# Patient Record
Sex: MALE | URBAN - METROPOLITAN AREA
[De-identification: names, ages, dates, MRNs, and addresses within clinical notes are randomized per-mention and may not be internally consistent; named-entity substitution may affect disease eponyms.]

---

## 2017-06-22 ENCOUNTER — APPOINTMENT (RX ONLY)
Dept: URBAN - METROPOLITAN AREA OTHER 12 | Facility: OTHER | Age: 57
Setting detail: DERMATOLOGY
End: 2017-06-22

## 2017-06-22 DIAGNOSIS — L57.0 ACTINIC KERATOSIS: ICD-10-CM

## 2017-06-22 PROBLEM — Z85.828 PERSONAL HISTORY OF OTHER MALIGNANT NEOPLASM OF SKIN: Status: ACTIVE | Noted: 2017-06-22

## 2017-06-22 PROCEDURE — 17003 DESTRUCT PREMALG LES 2-14: CPT

## 2017-06-22 PROCEDURE — ? COUNSELING

## 2017-06-22 PROCEDURE — 17000 DESTRUCT PREMALG LESION: CPT

## 2017-06-22 PROCEDURE — ? LIQUID NITROGEN

## 2017-06-22 ASSESSMENT — LOCATION SIMPLE DESCRIPTION DERM
LOCATION SIMPLE: RIGHT SCALP
LOCATION SIMPLE: RIGHT HAND

## 2017-06-22 ASSESSMENT — LOCATION ZONE DERM
LOCATION ZONE: SCALP
LOCATION ZONE: HAND

## 2017-06-22 ASSESSMENT — LOCATION DETAILED DESCRIPTION DERM
LOCATION DETAILED: RIGHT MEDIAL FRONTAL SCALP
LOCATION DETAILED: RIGHT ULNAR DORSAL HAND

## 2017-06-22 NOTE — HPI: EVALUATION OF SKIN LESION(S)
How Severe Are Your Spot(S)?: mild
Have Your Spot(S) Been Treated In The Past?: has not been treated
Hpi Title: Evaluation of a Skin Lesion
Additional History: Patient is traveling in from [place], [place].

## 2017-06-22 NOTE — HPI: SECONDARY COMPLAINT
How Severe Is This Condition?: mild
Additional History: Patient stated he noticed a little scale of the skin flaked off

## 2017-06-22 NOTE — PROCEDURE: LIQUID NITROGEN
Render Post-Care Instructions In Note?: no
Duration Of Freeze Thaw-Cycle (Seconds): 0
Post-Care Instructions: I reviewed with the patient in detail post-care instructions. Patient is to wear sunprotection, and avoid picking at any of the treated lesions. Pt may apply Vaseline to crusted or scabbing areas.
Consent: The patient's consent was obtained including but not limited to risks of crusting, scabbing, blistering, scarring, darker or lighter pigmentary change, recurrence, incomplete removal and infection.
Number Of Freeze-Thaw Cycles: 1 freeze-thaw cycle
Detail Level: Detailed

## 2023-08-21 ENCOUNTER — LAB (OUTPATIENT)
Dept: LAB | Facility: LAB | Age: 63
End: 2023-08-21
Payer: COMMERCIAL

## 2023-08-21 DIAGNOSIS — Z00.00 ROUTINE GENERAL MEDICAL EXAMINATION AT A HEALTH CARE FACILITY: ICD-10-CM

## 2023-08-21 DIAGNOSIS — R21 RASH: Primary | ICD-10-CM

## 2023-08-21 DIAGNOSIS — Z00.00 ROUTINE GENERAL MEDICAL EXAMINATION AT A HEALTH CARE FACILITY: Primary | ICD-10-CM

## 2023-08-21 LAB
ALANINE AMINOTRANSFERASE (SGPT) (U/L) IN SER/PLAS: 43 U/L (ref 10–52)
ALBUMIN (G/DL) IN SER/PLAS: 4.6 G/DL (ref 3.4–5)
ALKALINE PHOSPHATASE (U/L) IN SER/PLAS: 54 U/L (ref 33–136)
ANION GAP IN SER/PLAS: 12 MMOL/L (ref 10–20)
APPEARANCE, URINE: CLEAR
ASPARTATE AMINOTRANSFERASE (SGOT) (U/L) IN SER/PLAS: 36 U/L (ref 9–39)
BASOPHILS (10*3/UL) IN BLOOD BY AUTOMATED COUNT: 0.05 X10E9/L (ref 0–0.1)
BASOPHILS/100 LEUKOCYTES IN BLOOD BY AUTOMATED COUNT: 0.7 % (ref 0–2)
BILIRUBIN TOTAL (MG/DL) IN SER/PLAS: 1 MG/DL (ref 0–1.2)
BILIRUBIN, URINE: NEGATIVE
BLOOD, URINE: NEGATIVE
CALCIDIOL (25 OH VITAMIN D3) (NG/ML) IN SER/PLAS: 35 NG/ML
CALCIUM (MG/DL) IN SER/PLAS: 9.2 MG/DL (ref 8.6–10.6)
CARBON DIOXIDE, TOTAL (MMOL/L) IN SER/PLAS: 28 MMOL/L (ref 21–32)
CHLORIDE (MMOL/L) IN SER/PLAS: 101 MMOL/L (ref 98–107)
CHOLESTEROL (MG/DL) IN SER/PLAS: 187 MG/DL (ref 0–199)
CHOLESTEROL IN HDL (MG/DL) IN SER/PLAS: 49.6 MG/DL
CHOLESTEROL/HDL RATIO: 3.8
COBALAMIN (VITAMIN B12) (PG/ML) IN SER/PLAS: 429 PG/ML (ref 211–911)
COLOR, URINE: YELLOW
CREATININE (MG/DL) IN SER/PLAS: 0.85 MG/DL (ref 0.5–1.3)
EOSINOPHILS (10*3/UL) IN BLOOD BY AUTOMATED COUNT: 0.42 X10E9/L (ref 0–0.7)
EOSINOPHILS/100 LEUKOCYTES IN BLOOD BY AUTOMATED COUNT: 6.2 % (ref 0–6)
ERYTHROCYTE DISTRIBUTION WIDTH (RATIO) BY AUTOMATED COUNT: 13.6 % (ref 11.5–14.5)
ERYTHROCYTE MEAN CORPUSCULAR HEMOGLOBIN CONCENTRATION (G/DL) BY AUTOMATED: 33.6 G/DL (ref 32–36)
ERYTHROCYTE MEAN CORPUSCULAR VOLUME (FL) BY AUTOMATED COUNT: 95 FL (ref 80–100)
ERYTHROCYTES (10*6/UL) IN BLOOD BY AUTOMATED COUNT: 4.75 X10E12/L (ref 4.5–5.9)
ESTIMATED AVERAGE GLUCOSE FOR HBA1C: 91 MG/DL
GFR MALE: >90 ML/MIN/1.73M2
GLUCOSE (MG/DL) IN SER/PLAS: 91 MG/DL (ref 74–99)
GLUCOSE, URINE: NEGATIVE MG/DL
HEMATOCRIT (%) IN BLOOD BY AUTOMATED COUNT: 45 % (ref 41–52)
HEMOGLOBIN (G/DL) IN BLOOD: 15.1 G/DL (ref 13.5–17.5)
HEMOGLOBIN A1C/HEMOGLOBIN TOTAL IN BLOOD: 4.8 %
HIV 1/ 2 AG/AB SCREEN: NONREACTIVE
IMMATURE GRANULOCYTES/100 LEUKOCYTES IN BLOOD BY AUTOMATED COUNT: 0.3 % (ref 0–0.9)
IRON (UG/DL) IN SER/PLAS: 200 UG/DL (ref 35–150)
IRON BINDING CAPACITY (UG/DL) IN SER/PLAS: 385 UG/DL (ref 240–445)
IRON SATURATION (%) IN SER/PLAS: 52 % (ref 25–45)
KETONES, URINE: NEGATIVE MG/DL
LDL: ABNORMAL MG/DL (ref 0–99)
LEUKOCYTE ESTERASE, URINE: NEGATIVE
LEUKOCYTES (10*3/UL) IN BLOOD BY AUTOMATED COUNT: 6.8 X10E9/L (ref 4.4–11.3)
LYMPHOCYTES (10*3/UL) IN BLOOD BY AUTOMATED COUNT: 1.3 X10E9/L (ref 1.2–4.8)
LYMPHOCYTES/100 LEUKOCYTES IN BLOOD BY AUTOMATED COUNT: 19.1 % (ref 13–44)
MONOCYTES (10*3/UL) IN BLOOD BY AUTOMATED COUNT: 0.52 X10E9/L (ref 0.1–1)
MONOCYTES/100 LEUKOCYTES IN BLOOD BY AUTOMATED COUNT: 7.7 % (ref 2–10)
NEUTROPHILS (10*3/UL) IN BLOOD BY AUTOMATED COUNT: 4.48 X10E9/L (ref 1.2–7.7)
NEUTROPHILS/100 LEUKOCYTES IN BLOOD BY AUTOMATED COUNT: 66 % (ref 40–80)
NITRITE, URINE: NEGATIVE
NRBC (PER 100 WBCS) BY AUTOMATED COUNT: 0 /100 WBC (ref 0–0)
PH, URINE: 6 (ref 5–8)
PLATELETS (10*3/UL) IN BLOOD AUTOMATED COUNT: 186 X10E9/L (ref 150–450)
POTASSIUM (MMOL/L) IN SER/PLAS: 4.2 MMOL/L (ref 3.5–5.3)
PROTEIN TOTAL: 7.4 G/DL (ref 6.4–8.2)
PROTEIN, URINE: NEGATIVE MG/DL
SODIUM (MMOL/L) IN SER/PLAS: 137 MMOL/L (ref 136–145)
SPECIFIC GRAVITY, URINE: 1.01 (ref 1–1.03)
THYROTROPIN (MIU/L) IN SER/PLAS BY DETECTION LIMIT <= 0.05 MIU/L: 1.82 MIU/L (ref 0.44–3.98)
TRIGLYCERIDE (MG/DL) IN SER/PLAS: 500 MG/DL (ref 0–149)
UREA NITROGEN (MG/DL) IN SER/PLAS: 11 MG/DL (ref 6–23)
UROBILINOGEN, URINE: <2 MG/DL (ref 0–1.9)
VLDL: ABNORMAL MG/DL (ref 0–40)

## 2023-08-21 PROCEDURE — 85025 COMPLETE CBC W/AUTO DIFF WBC: CPT

## 2023-08-21 PROCEDURE — 80061 LIPID PANEL: CPT

## 2023-08-21 PROCEDURE — 84443 ASSAY THYROID STIM HORMONE: CPT

## 2023-08-21 PROCEDURE — 84153 ASSAY OF PSA TOTAL: CPT

## 2023-08-21 PROCEDURE — 36415 COLL VENOUS BLD VENIPUNCTURE: CPT

## 2023-08-21 PROCEDURE — 87591 N.GONORRHOEAE DNA AMP PROB: CPT

## 2023-08-21 PROCEDURE — 80053 COMPREHEN METABOLIC PANEL: CPT

## 2023-08-21 PROCEDURE — 83036 HEMOGLOBIN GLYCOSYLATED A1C: CPT

## 2023-08-21 PROCEDURE — 87389 HIV-1 AG W/HIV-1&-2 AB AG IA: CPT

## 2023-08-21 PROCEDURE — 83550 IRON BINDING TEST: CPT

## 2023-08-21 PROCEDURE — 87491 CHLMYD TRACH DNA AMP PROBE: CPT

## 2023-08-21 PROCEDURE — 81003 URINALYSIS AUTO W/O SCOPE: CPT

## 2023-08-21 PROCEDURE — 84154 ASSAY OF PSA FREE: CPT

## 2023-08-21 PROCEDURE — 82306 VITAMIN D 25 HYDROXY: CPT

## 2023-08-21 PROCEDURE — 82607 VITAMIN B-12: CPT

## 2023-08-21 PROCEDURE — 83540 ASSAY OF IRON: CPT

## 2023-08-22 LAB
CHLAMYDIA TRACH., AMPLIFIED: NEGATIVE
N. GONORRHEA, AMPLIFIED: NEGATIVE

## 2023-08-24 LAB
PROSTATE SPECIFIC AG (NG/ML) IN SER/PLAS: 0.9 NG/ML (ref 0–4)
PROSTATE SPECIFIC AG FREE (NG/ML) IN SER/PLAS: 0.2 NG/ML
PROSTATE SPECIFIC AG FREE/PROSTATE SPECIFIC AG TOTAL IN SER/PLAS: 22 %

## 2023-08-30 ASSESSMENT — PROMIS GLOBAL HEALTH SCALE
RATE_SOCIAL_SATISFACTION: VERY GOOD
CARRYOUT_SOCIAL_ACTIVITIES: VERY GOOD
RATE_GENERAL_HEALTH: VERY GOOD
RATE_PHYSICAL_HEALTH: VERY GOOD
RATE_QUALITY_OF_LIFE: EXCELLENT
CARRYOUT_PHYSICAL_ACTIVITIES: COMPLETELY
EMOTIONAL_PROBLEMS: SOMETIMES
RATE_AVERAGE_PAIN: 0
RATE_MENTAL_HEALTH: VERY GOOD

## 2023-09-06 ENCOUNTER — LAB (OUTPATIENT)
Dept: LAB | Facility: LAB | Age: 63
End: 2023-09-06
Payer: COMMERCIAL

## 2023-09-06 ENCOUNTER — OFFICE VISIT (OUTPATIENT)
Dept: PRIMARY CARE | Facility: CLINIC | Age: 63
End: 2023-09-06
Payer: COMMERCIAL

## 2023-09-06 VITALS
HEART RATE: 76 BPM | BODY MASS INDEX: 22.09 KG/M2 | WEIGHT: 157.8 LBS | HEIGHT: 71 IN | SYSTOLIC BLOOD PRESSURE: 102 MMHG | DIASTOLIC BLOOD PRESSURE: 62 MMHG

## 2023-09-06 DIAGNOSIS — F51.01 PRIMARY INSOMNIA: ICD-10-CM

## 2023-09-06 DIAGNOSIS — F51.01 PRIMARY INSOMNIA: Primary | ICD-10-CM

## 2023-09-06 DIAGNOSIS — Z00.00 ROUTINE GENERAL MEDICAL EXAMINATION AT A HEALTH CARE FACILITY: ICD-10-CM

## 2023-09-06 PROCEDURE — 99396 PREV VISIT EST AGE 40-64: CPT | Performed by: INTERNAL MEDICINE

## 2023-09-06 PROCEDURE — 80368 SEDATIVE HYPNOTICS: CPT

## 2023-09-06 PROCEDURE — 80307 DRUG TEST PRSMV CHEM ANLYZR: CPT

## 2023-09-06 PROCEDURE — 80358 DRUG SCREENING METHADONE: CPT

## 2023-09-06 PROCEDURE — 80365 DRUG SCREENING OXYCODONE: CPT

## 2023-09-06 PROCEDURE — 93000 ELECTROCARDIOGRAM COMPLETE: CPT | Performed by: INTERNAL MEDICINE

## 2023-09-06 PROCEDURE — 80361 OPIATES 1 OR MORE: CPT

## 2023-09-06 PROCEDURE — 80346 BENZODIAZEPINES1-12: CPT

## 2023-09-06 PROCEDURE — 80354 DRUG SCREENING FENTANYL: CPT

## 2023-09-06 PROCEDURE — 80373 DRUG SCREENING TRAMADOL: CPT

## 2023-09-06 RX ORDER — ZOLPIDEM TARTRATE 10 MG/1
TABLET ORAL
COMMUNITY
Start: 2016-09-09 | End: 2023-09-07 | Stop reason: SDUPTHER

## 2023-09-06 RX ORDER — DICLOFENAC POTASSIUM 50 MG/1
1 TABLET, FILM COATED ORAL 2 TIMES DAILY PRN
COMMUNITY
Start: 2021-08-13

## 2023-09-06 RX ORDER — ALLOPURINOL 300 MG/1
600 TABLET ORAL DAILY
COMMUNITY
End: 2024-02-16 | Stop reason: SDUPTHER

## 2023-09-06 RX ORDER — SILDENAFIL 100 MG/1
TABLET, FILM COATED ORAL
COMMUNITY
Start: 2021-08-13

## 2023-09-06 NOTE — PATIENT INSTRUCTIONS
Ghassan,     We are doing a urine drug test today for the zolpidem prescription.   Call [phone] to schedule calcium score with any  radiology department.   Get the flu shot anytime, the COVID shot when the updated version comes out in October.     CL

## 2023-09-06 NOTE — PROGRESS NOTES
"Ghassan Rabago is a 62 yo M presenting for CPE.     Abd pain last year. Tried dicyclomine c/b sig intolerances   Gout - allopurinol 600   Diverticulitis 4.2019 s/p sigmoid colectomy   Vit D def, resolved 8.23   Meniscal tear 4.2021 knee MRI   Hypertriglyceridemia, stbale and monitoring   Insomnia - zolpidem PRN   Occ hand arthritis - diclofenac PRN   ED - sildenafil PRN   Pityriasis rosea x2 - self resolved   Primary insomnia - zolpidem 10 mg daily No parasomnias.     #HM   -screen labs 8.23   -cscope 1.2020 - 10 years   -tdpa 2019       History of Present Illness  I have personally reviewed the [place]AR[place] report for Ghassan Arreola have considered the risks of abuse, dependence, addiction and diversion.   Is the patient prescribed a combination of a benzodiazepine and opioid? No.   Last urine drug screening date/ordered today: 9.6.23  Results of last screen: Results as expected.   Controlled Substance Agreement:   I have printed this form and reviewed each line item with the patient and the patient has verbalized understanding.   Date of the last Controlled Substance Agreement: 9.6.23  SLEEP AIDS   What is the patient’s goal of therapy? insomnia.  Is this being achieved with current treatment? yes.  Activities of Daily Living:   Yes, it is my opinion that this patient is benefitting from sleep aid therapy .   Physical functioning: Better   Family relationships: Better   Social relationships: Better   Mood: Better   Sleep patterns: Better   Overall functioning: Better     71\"   168 last year --> 157.8 lb         Gen Aox3 NAD well appearing   HEENT mmm pharynx clear no cervical LAD   Eyes sclerae clear   CV rrr nl s1/2 no m/r/g  Pulm CTAB no adventitious sounds   Ext warm dry no edema 2+ DP pulse           "

## 2023-09-07 ENCOUNTER — TELEPHONE (OUTPATIENT)
Dept: PRIMARY CARE | Facility: CLINIC | Age: 63
End: 2023-09-07
Payer: COMMERCIAL

## 2023-09-07 DIAGNOSIS — F51.01 PRIMARY INSOMNIA: Primary | ICD-10-CM

## 2023-09-07 RX ORDER — ZOLPIDEM TARTRATE 10 MG/1
TABLET ORAL
Qty: 90 TABLET | Refills: 0 | Status: SHIPPED | OUTPATIENT
Start: 2023-09-07

## 2023-09-11 LAB
6-ACETYLMORPHINE: <25 NG/ML
7-AMINOCLONAZEPAM: <25 NG/ML
ALPHA-HYDROXYALPRAZOLAM: <25 NG/ML
ALPHA-HYDROXYMIDAZOLAM: <25 NG/ML
ALPRAZOLAM: <25 NG/ML
AMPHETAMINE (PRESENCE) IN URINE BY SCREEN METHOD: ABNORMAL
BARBITURATES PRESENCE IN URINE BY SCREEN METHOD: ABNORMAL
CANNABINOIDS IN URINE BY SCREEN METHOD: ABNORMAL
CHLORDIAZEPOXIDE: <25 NG/ML
CLONAZEPAM: <25 NG/ML
COCAINE (PRESENCE) IN URINE BY SCREEN METHOD: ABNORMAL
CODEINE: <50 NG/ML
CREATINE, URINE FOR DRUG: 267.2 MG/DL
DIAZEPAM: <25 NG/ML
DRUG SCREEN COMMENT URINE: ABNORMAL
EDDP: <25 NG/ML
FENTANYL CONFIRMATION, URINE: <2.5 NG/ML
HYDROCODONE: <25 NG/ML
HYDROMORPHONE: <25 NG/ML
LORAZEPAM: <25 NG/ML
METHADONE CONFIRMATION,URINE: <25 NG/ML
MIDAZOLAM: <25 NG/ML
MORPHINE URINE: <50 NG/ML
NORDIAZEPAM: <25 NG/ML
NORFENTANYL: <2.5 NG/ML
NORHYDROCODONE: <25 NG/ML
NOROXYCODONE: <25 NG/ML
O-DESMETHYLTRAMADOL: <50 NG/ML
OXAZEPAM: <25 NG/ML
OXYCODONE: <25 NG/ML
OXYMORPHONE: <25 NG/ML
PHENCYCLIDINE (PRESENCE) IN URINE BY SCREEN METHOD: ABNORMAL
TEMAZEPAM: <25 NG/ML
TRAMADOL: <50 NG/ML
ZOLPIDEM METABOLITE (ZCA): >1000 NG/ML
ZOLPIDEM: 26 NG/ML

## 2024-02-16 DIAGNOSIS — M1A.9XX0 CHRONIC GOUT WITHOUT TOPHUS, UNSPECIFIED CAUSE, UNSPECIFIED SITE: ICD-10-CM

## 2024-02-17 RX ORDER — ALLOPURINOL 300 MG/1
600 TABLET ORAL DAILY
Qty: 180 TABLET | Refills: 0 | Status: SHIPPED | OUTPATIENT
Start: 2024-02-17 | End: 2024-04-22 | Stop reason: SDUPTHER

## 2024-04-22 DIAGNOSIS — M1A.9XX0 CHRONIC GOUT WITHOUT TOPHUS, UNSPECIFIED CAUSE, UNSPECIFIED SITE: ICD-10-CM

## 2024-04-22 RX ORDER — ALLOPURINOL 300 MG/1
600 TABLET ORAL DAILY
Qty: 180 TABLET | Refills: 0 | Status: SHIPPED | OUTPATIENT
Start: 2024-04-22

## 2024-05-13 ENCOUNTER — HOSPITAL ENCOUNTER (OUTPATIENT)
Dept: RADIOLOGY | Facility: CLINIC | Age: 64
Discharge: HOME | End: 2024-05-13
Payer: COMMERCIAL

## 2024-05-13 DIAGNOSIS — Z00.00 ROUTINE GENERAL MEDICAL EXAMINATION AT A HEALTH CARE FACILITY: ICD-10-CM

## 2024-05-13 PROCEDURE — 75571 CT HRT W/O DYE W/CA TEST: CPT

## 2024-05-16 ENCOUNTER — OFFICE VISIT (OUTPATIENT)
Dept: PRIMARY CARE | Facility: HOSPITAL | Age: 64
End: 2024-05-16
Payer: COMMERCIAL

## 2024-05-16 VITALS
HEIGHT: 71 IN | WEIGHT: 162 LBS | BODY MASS INDEX: 22.68 KG/M2 | HEART RATE: 94 BPM | DIASTOLIC BLOOD PRESSURE: 89 MMHG | SYSTOLIC BLOOD PRESSURE: 145 MMHG

## 2024-05-16 DIAGNOSIS — M10.9 GOUT, UNSPECIFIED CAUSE, UNSPECIFIED CHRONICITY, UNSPECIFIED SITE: ICD-10-CM

## 2024-05-16 DIAGNOSIS — N52.9 ERECTILE DYSFUNCTION, UNSPECIFIED ERECTILE DYSFUNCTION TYPE: Primary | ICD-10-CM

## 2024-05-16 DIAGNOSIS — Z11.3 ROUTINE SCREENING FOR STI (SEXUALLY TRANSMITTED INFECTION): ICD-10-CM

## 2024-05-16 DIAGNOSIS — E78.5 DYSLIPIDEMIA: ICD-10-CM

## 2024-05-16 DIAGNOSIS — N40.0 BENIGN PROSTATIC HYPERPLASIA, UNSPECIFIED WHETHER LOWER URINARY TRACT SYMPTOMS PRESENT: ICD-10-CM

## 2024-05-16 PROBLEM — J30.2 SEASONAL ALLERGIES: Status: ACTIVE | Noted: 2024-05-16

## 2024-05-16 PROBLEM — E78.1 HYPERTRIGLYCERIDEMIA: Status: ACTIVE | Noted: 2024-05-16

## 2024-05-16 PROCEDURE — 99215 OFFICE O/P EST HI 40 MIN: CPT | Performed by: INTERNAL MEDICINE

## 2024-05-16 PROCEDURE — 1036F TOBACCO NON-USER: CPT | Performed by: INTERNAL MEDICINE

## 2024-05-16 RX ORDER — ASCORBIC ACID 500 MG
500 TABLET ORAL DAILY
COMMUNITY

## 2024-05-16 RX ORDER — LORATADINE 10 MG/1
10 TABLET ORAL DAILY
COMMUNITY

## 2024-05-16 RX ORDER — BISMUTH SUBSALICYLATE 262 MG
1 TABLET,CHEWABLE ORAL DAILY
COMMUNITY

## 2024-05-16 RX ORDER — VIT C/E/ZN/COPPR/LUTEIN/ZEAXAN 250MG-90MG
25 CAPSULE ORAL DAILY
COMMUNITY

## 2024-05-16 RX ORDER — TADALAFIL 5 MG/1
5 TABLET ORAL DAILY
Qty: 30 TABLET | Refills: 1 | Status: SHIPPED | OUTPATIENT
Start: 2024-05-16

## 2024-05-16 NOTE — PROGRESS NOTES
Chief Complaint   Patient presents with    [facility]         History Of Present Illness:    Ghassan Rabago is a 64 y.o. male presenting to [facility].  Ghassan has history of hypertriglyceridemia and goat.  Working on diet and limiting alcohol.  Not on pharmacotherapy for TG.  Gout controlled with allopurinol.    Met with derm yesterday for routine skin check.   CAC scheduled this week   Will do labs and UA prior to Sept   Seasonal allergies are managed with OTC claritin  Recent cold symptoms - covid negative   Was taking ambien regularly and then stopped abruptly over the past year  Recovering from hurtful experience - dated for 8 months and then relationship ended abruptly.   Occ sildenafil - open to tadalafil.  Not active recently and no concerns about STI. Screening updated this year.    2021 - may have had panic attack, living in [place], sudden bout of heart racing.  Lasted for about an hour   2022 - had another episode of palpitations (in [place], hot outside), abated after an hour  Has not worn heart monitor, no documented arrhythmia   Has history of bowel obstruction, diverticulitis.  Currently asymptomatic.     Active Medical Problems:  Patient Active Problem List    Diagnosis Date Noted    Diverticulitis 05/18/2024    Erectile dysfunction 05/18/2024    Benign prostatic hyperplasia 05/18/2024    Dyslipidemia 05/18/2024    Routine screening for STI (sexually transmitted infection) 05/18/2024    Seasonal allergies 05/16/2024    Gout 05/16/2024    Hypertriglyceridemia 05/16/2024       Past Medical History:  Past Medical History:   Diagnosis Date    Allergic 2020    Bowel obstruction (Multi) 09/2020    subsequent to colectomy    Diverticulitis     complicated,had perforation and sigmoid colectomy    Gout     Hypertriglyceridemia     Incisional hernia     Seasonal allergies        Past Surgical History:  Past Surgical History:   Procedure Laterality Date    COLON SURGERY  April 2019    HERNIA REPAIR  June  2020    SINUS SURGERY  2009    deviated septum         Social History:  Social History     Tobacco Use    Smoking status: Former     Current packs/day: 0.00     Average packs/day: 1 pack/day for 10.0 years (10.0 ttl pk-yrs)     Types: Cigarettes     Quit date: 2003     Years since quittin.8    Smokeless tobacco: Never    Tobacco comments:     Quit 20 years ago   Substance Use Topics    Alcohol use: Yes     Alcohol/week: 7.0 standard drinks of alcohol     Types: 2 Glasses of wine, 5 Standard drinks or equivalent per week    Drug use: Never         Family History:  Family History   Problem Relation Name Age of Onset    Dementia Mother      Atrial fibrillation Father Sam Rabago     Heart disease Father Sam Rabago     Dementia Sister      Neuropathy Sister      Prostate cancer Brother      No Known Problems Brother      No Known Problems Brother      No Known Problems Brother          Allergies:  Patient has no known allergies.    Outpatient Medications:    Current Outpatient Medications:     allopurinol (Zyloprim) 300 mg tablet, Take 2 tablets (600 mg) by mouth once daily., Disp: 180 tablet, Rfl: 0    ascorbic acid (Vitamin C) 500 mg tablet, Take 1 tablet (500 mg) by mouth once daily., Disp: , Rfl:     cholecalciferol (Vitamin D3) 25 MCG (1000 UT) capsule, Take 1 capsule (25 mcg) by mouth once daily., Disp: , Rfl:     diclofenac (Cataflam) 50 mg tablet, Take 1 tablet (50 mg) by mouth 2 times a day as needed., Disp: , Rfl:     loratadine (Claritin) 10 mg tablet, Take 1 tablet (10 mg) by mouth once daily., Disp: , Rfl:     multivitamin tablet, Take 1 tablet by mouth once daily., Disp: , Rfl:     sildenafil (Viagra) 100 mg tablet, Take by mouth., Disp: , Rfl:     tadalafil (Cialis) 5 mg tablet, Take 1 tablet (5 mg) by mouth once daily., Disp: 30 tablet, Rfl: 1    zolpidem (Ambien) 10 mg tablet, Take 1 tab PO at bedtime PRN insomnia, Disp: 90 tablet, Rfl: 0      Review of Systems:  Pertinent positives  "in review of systems outlined above.  Complete ROS otherwise negative.           Last Recorded Vitals:  Vitals:    05/16/24 1348   BP: 145/89   Pulse: 94   Weight: 73.5 kg (162 lb)   Height: 1.803 m (5' 11\")   Body mass index is 22.59 kg/m².          The 10-year ASCVD risk score (Andree DOBSON, et al., 2019) is: 12.8%    Values used to calculate the score:      Age: 64 years      Sex: Male      Is Non- : No      Diabetic: No      Tobacco smoker: No      Systolic Blood Pressure: 145 mmHg      Is BP treated: No      HDL Cholesterol: 49.6 mg/dL      Total Cholesterol: 187 mg/dL      Assessment/Plan   Problem List Items Addressed This Visit       Gout     Advised on limiting alcohol.  Will check uric acid level at next visit.          Relevant Orders    Comprehensive Metabolic Panel    CBC and Auto Differential    Uric acid    Erectile dysfunction - Primary     Will update cardiometabolic screening at next visit.  To begin trial of daily tadalafil.          Relevant Medications    tadalafil (Cialis) 5 mg tablet    Benign prostatic hyperplasia     Asymptomatic.  PSA with next routine labs.          Relevant Orders    Prostate Specific Antigen    Dyslipidemia     Discussed importance of alcohol cessation.  Will repeat lipids prior to next visit.          Relevant Orders    Comprehensive Metabolic Panel    Lipid Panel    Routine screening for STI (sexually transmitted infection)     To be updated at yearly physical.   Will discuss PREP at next visit         Relevant Orders    C. Trachomatis / N. Gonorrhoeae, Amplified Detection    HIV 1/2 Antigen/Antibody Screen with Reflex to Confirmation    Syphilis Screen with Reflex    Hepatitis C Antibody    Hepatitis B Surface Antibody         A total of 60 minutes was spent reviewing the chart and recent testing and discussing plan of care.     Carlos Cardona MD  "

## 2024-05-18 PROBLEM — N52.9 ERECTILE DYSFUNCTION: Status: ACTIVE | Noted: 2024-05-18

## 2024-05-18 PROBLEM — N40.0 BENIGN PROSTATIC HYPERPLASIA: Status: ACTIVE | Noted: 2024-05-18

## 2024-05-18 PROBLEM — Z11.3 ROUTINE SCREENING FOR STI (SEXUALLY TRANSMITTED INFECTION): Status: ACTIVE | Noted: 2024-05-18

## 2024-05-18 PROBLEM — E78.5 DYSLIPIDEMIA: Status: ACTIVE | Noted: 2024-05-18

## 2024-05-18 PROBLEM — K57.92 DIVERTICULITIS: Status: ACTIVE | Noted: 2024-05-18

## 2024-07-25 DIAGNOSIS — F51.01 PRIMARY INSOMNIA: Primary | ICD-10-CM

## 2024-09-04 ENCOUNTER — LAB (OUTPATIENT)
Dept: LAB | Facility: LAB | Age: 64
End: 2024-09-04
Payer: COMMERCIAL

## 2024-09-04 DIAGNOSIS — Z11.3 ROUTINE SCREENING FOR STI (SEXUALLY TRANSMITTED INFECTION): ICD-10-CM

## 2024-09-04 DIAGNOSIS — F51.01 PRIMARY INSOMNIA: ICD-10-CM

## 2024-09-04 DIAGNOSIS — E78.5 DYSLIPIDEMIA: ICD-10-CM

## 2024-09-04 DIAGNOSIS — M10.9 GOUT, UNSPECIFIED CAUSE, UNSPECIFIED CHRONICITY, UNSPECIFIED SITE: ICD-10-CM

## 2024-09-04 DIAGNOSIS — N40.0 BENIGN PROSTATIC HYPERPLASIA, UNSPECIFIED WHETHER LOWER URINARY TRACT SYMPTOMS PRESENT: ICD-10-CM

## 2024-09-04 LAB
ALBUMIN SERPL BCP-MCNC: 4.7 G/DL (ref 3.4–5)
ALP SERPL-CCNC: 48 U/L (ref 33–136)
ALT SERPL W P-5'-P-CCNC: 56 U/L (ref 10–52)
AMPHETAMINES UR QL SCN: NORMAL
ANION GAP SERPL CALC-SCNC: 14 MMOL/L (ref 10–20)
AST SERPL W P-5'-P-CCNC: 43 U/L (ref 9–39)
BARBITURATES UR QL SCN: NORMAL
BASOPHILS # BLD AUTO: 0.02 X10*3/UL (ref 0–0.1)
BASOPHILS NFR BLD AUTO: 0.4 %
BENZODIAZ UR QL SCN: NORMAL
BILIRUB SERPL-MCNC: 1 MG/DL (ref 0–1.2)
BUN SERPL-MCNC: 12 MG/DL (ref 6–23)
BZE UR QL SCN: NORMAL
CALCIUM SERPL-MCNC: 9.4 MG/DL (ref 8.6–10.6)
CANNABINOIDS UR QL SCN: NORMAL
CHLORIDE SERPL-SCNC: 100 MMOL/L (ref 98–107)
CHOLEST SERPL-MCNC: 219 MG/DL (ref 0–199)
CHOLESTEROL/HDL RATIO: 4
CO2 SERPL-SCNC: 28 MMOL/L (ref 21–32)
CREAT SERPL-MCNC: 0.75 MG/DL (ref 0.5–1.3)
EGFRCR SERPLBLD CKD-EPI 2021: >90 ML/MIN/1.73M*2
EOSINOPHIL # BLD AUTO: 0.32 X10*3/UL (ref 0–0.7)
EOSINOPHIL NFR BLD AUTO: 6.3 %
ERYTHROCYTE [DISTWIDTH] IN BLOOD BY AUTOMATED COUNT: 13.6 % (ref 11.5–14.5)
FENTANYL+NORFENTANYL UR QL SCN: NORMAL
GLUCOSE SERPL-MCNC: 92 MG/DL (ref 74–99)
HBV SURFACE AB SER-ACNC: <3.1 MIU/ML
HCT VFR BLD AUTO: 44.8 % (ref 41–52)
HCV AB SER QL: NONREACTIVE
HDLC SERPL-MCNC: 55.4 MG/DL
HGB BLD-MCNC: 15.3 G/DL (ref 13.5–17.5)
HIV 1+2 AB+HIV1 P24 AG SERPL QL IA: NONREACTIVE
IMM GRANULOCYTES # BLD AUTO: 0.02 X10*3/UL (ref 0–0.7)
IMM GRANULOCYTES NFR BLD AUTO: 0.4 % (ref 0–0.9)
LDLC SERPL CALC-MCNC: 111 MG/DL
LYMPHOCYTES # BLD AUTO: 1.42 X10*3/UL (ref 1.2–4.8)
LYMPHOCYTES NFR BLD AUTO: 28.1 %
MCH RBC QN AUTO: 31.4 PG (ref 26–34)
MCHC RBC AUTO-ENTMCNC: 34.2 G/DL (ref 32–36)
MCV RBC AUTO: 92 FL (ref 80–100)
METHADONE UR QL SCN: NORMAL
MONOCYTES # BLD AUTO: 0.43 X10*3/UL (ref 0.1–1)
MONOCYTES NFR BLD AUTO: 8.5 %
NEUTROPHILS # BLD AUTO: 2.85 X10*3/UL (ref 1.2–7.7)
NEUTROPHILS NFR BLD AUTO: 56.3 %
NON HDL CHOLESTEROL: 164 MG/DL (ref 0–149)
NRBC BLD-RTO: 0 /100 WBCS (ref 0–0)
OPIATES UR QL SCN: NORMAL
OXYCODONE+OXYMORPHONE UR QL SCN: NORMAL
PCP UR QL SCN: NORMAL
PLATELET # BLD AUTO: 169 X10*3/UL (ref 150–450)
POTASSIUM SERPL-SCNC: 4.1 MMOL/L (ref 3.5–5.3)
PROT SERPL-MCNC: 7.4 G/DL (ref 6.4–8.2)
PSA SERPL-MCNC: 1.6 NG/ML
RBC # BLD AUTO: 4.87 X10*6/UL (ref 4.5–5.9)
SODIUM SERPL-SCNC: 138 MMOL/L (ref 136–145)
TREPONEMA PALLIDUM IGG+IGM AB [PRESENCE] IN SERUM OR PLASMA BY IMMUNOASSAY: NONREACTIVE
TRIGL SERPL-MCNC: 264 MG/DL (ref 0–149)
URATE SERPL-MCNC: 3.2 MG/DL (ref 4–7.5)
VLDL: 53 MG/DL (ref 0–40)
WBC # BLD AUTO: 5.1 X10*3/UL (ref 4.4–11.3)

## 2024-09-04 PROCEDURE — 87389 HIV-1 AG W/HIV-1&-2 AB AG IA: CPT

## 2024-09-04 PROCEDURE — 87491 CHLMYD TRACH DNA AMP PROBE: CPT

## 2024-09-04 PROCEDURE — 84550 ASSAY OF BLOOD/URIC ACID: CPT

## 2024-09-04 PROCEDURE — 80307 DRUG TEST PRSMV CHEM ANLYZR: CPT

## 2024-09-04 PROCEDURE — 85025 COMPLETE CBC W/AUTO DIFF WBC: CPT

## 2024-09-04 PROCEDURE — 87591 N.GONORRHOEAE DNA AMP PROB: CPT

## 2024-09-04 PROCEDURE — 84153 ASSAY OF PSA TOTAL: CPT

## 2024-09-04 PROCEDURE — 80053 COMPREHEN METABOLIC PANEL: CPT

## 2024-09-04 PROCEDURE — 86780 TREPONEMA PALLIDUM: CPT

## 2024-09-04 PROCEDURE — 86706 HEP B SURFACE ANTIBODY: CPT

## 2024-09-04 PROCEDURE — 86803 HEPATITIS C AB TEST: CPT

## 2024-09-04 PROCEDURE — 36415 COLL VENOUS BLD VENIPUNCTURE: CPT

## 2024-09-04 PROCEDURE — 80061 LIPID PANEL: CPT

## 2024-09-05 LAB
C TRACH RRNA SPEC QL NAA+PROBE: NEGATIVE
N GONORRHOEA DNA SPEC QL PROBE+SIG AMP: NEGATIVE

## 2024-09-24 ASSESSMENT — PROMIS GLOBAL HEALTH SCALE
EMOTIONAL_PROBLEMS: NEVER
CARRYOUT_PHYSICAL_ACTIVITIES: COMPLETELY
RATE_PHYSICAL_HEALTH: VERY GOOD
CARRYOUT_SOCIAL_ACTIVITIES: VERY GOOD
RATE_AVERAGE_PAIN: 0
RATE_SOCIAL_SATISFACTION: VERY GOOD
RATE_GENERAL_HEALTH: VERY GOOD
RATE_QUALITY_OF_LIFE: VERY GOOD
RATE_MENTAL_HEALTH: VERY GOOD

## 2024-09-24 NOTE — PROGRESS NOTES
Chief Complaint:   Follow-up (Med refills/)     History Of Present Illness:    Ghassan Rabago is a 64 y.o. male with active medical problems outlined below who presents for evaluation and management of dyslipidemia and gout.    Initial 5/16/24  Ghassan has history of hypertriglyceridemia and gout.  Working on diet and limiting alcohol.  Not on pharmacotherapy for TG.  Gout controlled with allopurinol.    Met with derm yesterday for routine skin check.   CAC scheduled this week   Will do labs and UA prior to Sept   Seasonal allergies are managed with OTC claritin  Recent cold symptoms - covid negative   Was taking ambien regularly and then stopped abruptly over the past year  Recovering from hurtful experience - dated for 8 months and then relationship ended abruptly.   Occ sildenafil - open to tadalafil.  Not active recently and no concerns about STI. Screening updated this year.    2021 - may have had panic attack, living in [place], sudden bout of heart racing.  Lasted for about an hour   2022 - had another episode of palpitations (in [place], hot outside), abated after an hour  Has not worn heart monitor, no documented arrhythmia   Has history of bowel obstruction, diverticulitis.  Currently asymptomatic.       INTERVAL HISTORY 9/25/24  CT calcium 8 on 5/13/24  Has not started tadalafil - has met men from [facility] and [place] but not serious relationship on horizon.  Sister's condition has worsened, progressive dementia and putting a stress on him and his brother in law.  She is only 72 and her decline has been sad and emotionally taxing.  Got flu and covid vaccine this morning   Colonoscopy 1/7/20 - diverticulosis , no polyps, repeat in 10 yrs   Taking metamucil daily, could have more fiber in diet.   Less exercise over the summer -out of routine.   Will do 2 hours of stretching, light hand weights, machines (legs,arms, crunches), 40 min on bike, 20 min on rowing machine.  Able to exercise without chest pain or shortness of  breath.   Recent labs showed slight elevated in AST, ALT and TG.   Ghassan's alcohol intake has increased over the summer months.         Patient Active Problem List   Diagnosis    Seasonal allergies    Gout    Hypertriglyceridemia    Diverticulitis    Erectile dysfunction    Benign prostatic hyperplasia    Dyslipidemia    Routine screening for STI (sexually transmitted infection)    Primary insomnia    Abnormal LFTs       Current Outpatient Medications:     allopurinol (Zyloprim) 300 mg tablet, Take 2 tablets (600 mg) by mouth once daily., Disp: 180 tablet, Rfl: 3    ascorbic acid (Vitamin C) 500 mg tablet, Take 1 tablet (500 mg) by mouth once daily., Disp: , Rfl:     cholecalciferol (Vitamin D3) 25 MCG (1000 UT) capsule, Take 1 capsule (25 mcg) by mouth once daily., Disp: , Rfl:     diclofenac (Cataflam) 50 mg tablet, Take 1 tablet (50 mg) by mouth 2 times a day as needed., Disp: , Rfl:     loratadine (Claritin) 10 mg tablet, Take 1 tablet (10 mg) by mouth once daily., Disp: , Rfl:     multivitamin tablet, Take 1 tablet by mouth once daily., Disp: , Rfl:     sildenafil (Viagra) 100 mg tablet, Take by mouth., Disp: , Rfl:     tadalafil (Cialis) 5 mg tablet, Take 1 tablet (5 mg) by mouth once daily., Disp: 30 tablet, Rfl: 1    zolpidem (Ambien) 10 mg tablet, Take 1 tab PO at bedtime PRN insomnia, Disp: 90 tablet, Rfl: 0  Patient has no known allergies.  Social History     Tobacco Use    Smoking status: Former     Current packs/day: 0.00     Average packs/day: 1 pack/day for 10.0 years (10.0 ttl pk-yrs)     Types: Cigarettes     Quit date: 2003     Years since quittin.1    Smokeless tobacco: Never    Tobacco comments:     Quit 20 years ago   Substance Use Topics    Alcohol use: Yes     Alcohol/week: 7.0 standard drinks of alcohol     Types: 2 Glasses of wine, 5 Standard drinks or equivalent per week    Drug use: Never         All pertinent aspects of medical and surgical history were reviewed and updated in  chart    Review of Systems   Pertinent positives in review of systems outlined above.  Complete ROS otherwise negative.        Last Recorded Vitals:  Vitals:    09/25/24 1147   BP: (!) 150/98   Pulse: 98   Temp: 36.7 °C (98.1 °F)   SpO2: 100%               The 10-year ASCVD risk score (Andree DOBSON, et al., 2019) is: 14.3%    Values used to calculate the score:      Age: 64 years      Sex: Male      Is Non- : No      Diabetic: No      Tobacco smoker: No      Systolic Blood Pressure: 150 mmHg      Is BP treated: No      HDL Cholesterol: 55.4 mg/dL      Total Cholesterol: 219 mg/dL      Assessment/Plan   Problem List Items Addressed This Visit       Gout     Doing well on allopurinol.  Uric acid levels suppressed.  May be able to reduce dose when alcohol intake is down.          Relevant Medications    allopurinol (Zyloprim) 300 mg tablet    Other Relevant Orders    Comprehensive Metabolic Panel    Dyslipidemia - Primary     Discussed Portfolio Diet to lower cholesterol and TG.  To eat from each food group on that diet on a daily basis.  To limit alcohol intake and increase frequency of exercise.  Will check lipids in 6mo         Relevant Orders    Comprehensive Metabolic Panel    Lipid Panel    Primary insomnia     Using zolpidem on an as needed basis. Refilled.          Relevant Medications    zolpidem (Ambien) 10 mg tablet    Abnormal LFTs     Slight elevation in LFT's correlates with poor diet and increased alcohol consumption.  Ghassan plans to cut back on alcohol, and LFT's will be repeated with next labs.  Recent Hep C and HIV screen negative.          Relevant Orders    Comprehensive Metabolic Panel       A total of 30 minutes was spent reviewing the chart and recent testing and discussing plan of care.         Carlos Cardona MD

## 2024-09-25 ENCOUNTER — APPOINTMENT (OUTPATIENT)
Dept: PRIMARY CARE | Facility: CLINIC | Age: 64
End: 2024-09-25
Payer: COMMERCIAL

## 2024-09-25 VITALS
SYSTOLIC BLOOD PRESSURE: 150 MMHG | HEART RATE: 98 BPM | BODY MASS INDEX: 22.59 KG/M2 | DIASTOLIC BLOOD PRESSURE: 98 MMHG | WEIGHT: 162 LBS | TEMPERATURE: 98.1 F | OXYGEN SATURATION: 100 %

## 2024-09-25 DIAGNOSIS — E78.5 DYSLIPIDEMIA: Primary | ICD-10-CM

## 2024-09-25 DIAGNOSIS — M1A.9XX0 CHRONIC GOUT WITHOUT TOPHUS, UNSPECIFIED CAUSE, UNSPECIFIED SITE: ICD-10-CM

## 2024-09-25 DIAGNOSIS — R79.89 ABNORMAL LFTS: ICD-10-CM

## 2024-09-25 DIAGNOSIS — F51.01 PRIMARY INSOMNIA: ICD-10-CM

## 2024-09-25 PROCEDURE — 99214 OFFICE O/P EST MOD 30 MIN: CPT | Performed by: INTERNAL MEDICINE

## 2024-09-25 RX ORDER — ALLOPURINOL 300 MG/1
600 TABLET ORAL DAILY
Qty: 180 TABLET | Refills: 3 | Status: SHIPPED | OUTPATIENT
Start: 2024-09-25

## 2024-09-25 RX ORDER — ZOLPIDEM TARTRATE 10 MG/1
TABLET ORAL
Qty: 90 TABLET | Refills: 0 | Status: SHIPPED | OUTPATIENT
Start: 2024-09-25

## 2024-09-25 NOTE — PATIENT INSTRUCTIONS
Limit alcohol - no more than 2 per day     Portfolio Diet     Fasting blood work in Feb     Schedule with Dr. Henley

## 2024-09-29 PROBLEM — R79.89 ABNORMAL LFTS: Status: ACTIVE | Noted: 2024-09-29

## 2024-09-29 PROBLEM — F51.01 PRIMARY INSOMNIA: Status: ACTIVE | Noted: 2024-09-29

## 2024-09-29 NOTE — ASSESSMENT & PLAN NOTE
Slight elevation in LFT's correlates with poor diet and increased alcohol consumption.  Ghassan plans to cut back on alcohol, and LFT's will be repeated with next labs.  Recent Hep C and HIV screen negative.

## 2024-09-29 NOTE — ASSESSMENT & PLAN NOTE
Discussed Portfolio Diet to lower cholesterol and TG.  To eat from each food group on that diet on a daily basis.  To limit alcohol intake and increase frequency of exercise.  Will check lipids in 6mo

## 2024-09-29 NOTE — ASSESSMENT & PLAN NOTE
Doing well on allopurinol.  Uric acid levels suppressed.  May be able to reduce dose when alcohol intake is down.

## 2025-05-01 PROBLEM — N40.0 BENIGN PROSTATIC HYPERPLASIA: Status: RESOLVED | Noted: 2024-05-18 | Resolved: 2025-05-01

## 2025-05-01 PROBLEM — E78.1 HYPERTRIGLYCERIDEMIA: Status: RESOLVED | Noted: 2024-05-16 | Resolved: 2025-05-01

## 2025-05-01 PROBLEM — Z11.3 ROUTINE SCREENING FOR STI (SEXUALLY TRANSMITTED INFECTION): Status: RESOLVED | Noted: 2024-05-18 | Resolved: 2025-05-01

## 2025-05-01 NOTE — ASSESSMENT & PLAN NOTE
-LDL Goal <100.  -Patient is above goal.   -Discussed Portfolio Diet to lower cholesterol and TG.  To eat from each food group on that diet on a daily basis.  To limit alcohol intake and increase frequency of exercise.  Will check lipids in 6mo

## 2025-05-01 NOTE — PROGRESS NOTES
"Subjective   Reason for Visit: Ghassan Rabago is an 64 y.o. male here for establishment of care.     Past Medical, Surgical, and Family History reviewed and updated in chart.    Reviewed all medications by prescribing practitioner or clinical pharmacist (such as prescriptions, OTCs, herbal therapies and supplements) and documented in the medical record.    HPI  Initial PCP History 5/2/2025:  -Patient is here to establish care. This is the   -Patient started having gout 2009. He was able to decrease incidences after cutting back on alcohol. During the pandemic. He started allopurinol in Jan 2021. Several months later he had a gout attack. The dosage of allopurinol was increased. Last attack in September 2021.   -Patient is retired from [employer] and travel between [facility] and [place].       Patient Care Team:  Johnyn Ramirez MD as PCP - General (Family Medicine)  Carlos Cardona MD as PCP - [facility] PCP     Review of Systems   Constitutional:  Negative for chills, fever and unexpected weight change.   HENT:  Negative for rhinorrhea.    Eyes:  Negative for pain.   Respiratory:  Negative for shortness of breath.    Cardiovascular:  Negative for chest pain.   Gastrointestinal:  Negative for abdominal pain.   Genitourinary:  Negative for hematuria.   Skin:  Negative for rash.   Neurological:  Negative for dizziness, syncope and light-headedness.   Psychiatric/Behavioral:  Negative for behavioral problems and suicidal ideas.        Objective   Vitals:  /90 (BP Location: Left arm, Patient Position: Sitting, BP Cuff Size: Adult)   Pulse 82   Temp 36.8 °C (98.2 °F) (Temporal)   Ht 1.803 m (5' 11\")   Wt 73.5 kg (162 lb)   SpO2 99%   BMI 22.59 kg/m²       Physical Exam  Vitals reviewed.   Constitutional:       General: He is not in acute distress.  HENT:      Head: Normocephalic.      Right Ear: External ear normal.      Left Ear: External ear normal.      Nose: No rhinorrhea.      " Mouth/Throat:      Mouth: Mucous membranes are moist.   Eyes:      Conjunctiva/sclera: Conjunctivae normal.   Cardiovascular:      Rate and Rhythm: Normal rate and regular rhythm.      Heart sounds: No murmur heard.     No friction rub. No gallop.   Pulmonary:      Effort: No respiratory distress.      Breath sounds: No wheezing, rhonchi or rales.   Abdominal:      General: Bowel sounds are normal. There is no distension.      Palpations: Abdomen is soft.      Tenderness: There is no abdominal tenderness.   Musculoskeletal:      Cervical back: Neck supple.      Right lower leg: No edema.      Left lower leg: No edema.   Skin:     General: Skin is warm and dry.      Capillary Refill: Capillary refill takes less than 2 seconds.   Neurological:      Mental Status: He is alert.      Gait: Gait normal.         Problem List Items Addressed This Visit          Medium    Gout - Primary    Current Assessment & Plan   -Uric acid Goal <6.  -Obtain Uric Acid level 5/2/2025 (when we switched from 450mg to 300mg daily on 5/2/2025).  -Will Repeat Uric Acid August-September 2025.   - Continue to monitor.         Relevant Orders    Uric acid    Uric acid    History of diverticulitis    Overview   complicated,had perforation and sigmoid colectomy         Current Assessment & Plan   -History of diverticulitis.  -Will continue to monitor.          Erectile dysfunction    Dyslipidemia    Current Assessment & Plan   -LDL Goal <100.  -Patient is above goal.   -Discussed Portfolio Diet to lower cholesterol and TG.  To eat from each food group on that diet on a daily basis.  To limit alcohol intake and increase frequency of exercise.  Will check lipids in 6mo         Relevant Orders    Lipid panel    Primary insomnia    Current Assessment & Plan   -Patient continue to cut back on Ambien.  -Patient travels between the , [facility], and [place].   -We talked about future plans for switching from Ambien to Trazadone/Melatonin.     What is the  patient’s goal of therapy? insomnia.  Is this being achieved with current treatment? yes.  Activities of Daily Living:   Yes, it is my opinion that this patient is benefitting from sleep aid therapy .   Physical functioning: Better   Family relationships: Better   Social relationships: Better   Mood: Better   Sleep patterns: Better   Overall functioning: Better           Other Visit Diagnoses         Routine general medical examination at a health care facility        Relevant Orders    Lipid panel    CBC and Auto Differential    Comprehensive Metabolic Panel      Encounter for medication monitoring        Relevant Orders    CBC and Auto Differential    Comprehensive Metabolic Panel      Encounter for prostate cancer screening        Relevant Orders    Prostate Specific Antigen, Screen      Hyperglycemia        Relevant Orders    Hemoglobin A1c        -Consider Losartan if BP still high.       Medical decision making during this visit had more complexity inherent to evaluation and management associated with medical care services that serve as the continuing focal point for all needed health care services and/or with medical care services that are part of ongoing care related to a patient's single, serious condition or a complex condition.

## 2025-05-01 NOTE — ASSESSMENT & PLAN NOTE
-Uric acid Goal <6.  -Obtain Uric Acid level 5/2/2025 (when we switched from 450mg to 300mg daily on 5/2/2025).  -Will Repeat Uric Acid August-September 2025.   - Continue to monitor.

## 2025-05-01 NOTE — ASSESSMENT & PLAN NOTE
-Patient continue to cut back on Ambien.  -Patient travels between the , [facility], and [place].   -We talked about future plans for switching from Ambien to Trazadone/Melatonin.     What is the patient’s goal of therapy? insomnia.  Is this being achieved with current treatment? yes.  Activities of Daily Living:   Yes, it is my opinion that this patient is benefitting from sleep aid therapy .   Physical functioning: Better   Family relationships: Better   Social relationships: Better   Mood: Better   Sleep patterns: Better   Overall functioning: Better

## 2025-05-02 ENCOUNTER — APPOINTMENT (OUTPATIENT)
Dept: PRIMARY CARE | Facility: CLINIC | Age: 65
End: 2025-05-02
Payer: COMMERCIAL

## 2025-05-02 VITALS
WEIGHT: 162 LBS | HEIGHT: 71 IN | HEART RATE: 82 BPM | OXYGEN SATURATION: 99 % | TEMPERATURE: 98.2 F | SYSTOLIC BLOOD PRESSURE: 155 MMHG | DIASTOLIC BLOOD PRESSURE: 90 MMHG | BODY MASS INDEX: 22.68 KG/M2

## 2025-05-02 DIAGNOSIS — Z51.81 ENCOUNTER FOR MEDICATION MONITORING: ICD-10-CM

## 2025-05-02 DIAGNOSIS — N52.9 ERECTILE DYSFUNCTION, UNSPECIFIED ERECTILE DYSFUNCTION TYPE: ICD-10-CM

## 2025-05-02 DIAGNOSIS — M1A.09X0 CHRONIC GOUT OF MULTIPLE SITES, UNSPECIFIED CAUSE: Primary | ICD-10-CM

## 2025-05-02 DIAGNOSIS — F51.01 PRIMARY INSOMNIA: ICD-10-CM

## 2025-05-02 DIAGNOSIS — R73.9 HYPERGLYCEMIA: ICD-10-CM

## 2025-05-02 DIAGNOSIS — Z87.19 HISTORY OF DIVERTICULITIS: ICD-10-CM

## 2025-05-02 DIAGNOSIS — E78.5 DYSLIPIDEMIA: ICD-10-CM

## 2025-05-02 DIAGNOSIS — Z00.00 ROUTINE GENERAL MEDICAL EXAMINATION AT A HEALTH CARE FACILITY: ICD-10-CM

## 2025-05-02 DIAGNOSIS — Z12.5 ENCOUNTER FOR PROSTATE CANCER SCREENING: ICD-10-CM

## 2025-05-02 PROBLEM — J30.2 SEASONAL ALLERGIES: Status: RESOLVED | Noted: 2024-05-16 | Resolved: 2025-05-02

## 2025-05-02 PROBLEM — R79.89 ABNORMAL LFTS: Status: RESOLVED | Noted: 2024-09-29 | Resolved: 2025-05-02

## 2025-05-02 LAB
ALBUMIN SERPL-MCNC: 4.8 G/DL (ref 3.6–5.1)
ALP SERPL-CCNC: 46 U/L (ref 35–144)
ALT SERPL-CCNC: 30 U/L (ref 9–46)
ANION GAP SERPL CALCULATED.4IONS-SCNC: 10 MMOL/L (CALC) (ref 7–17)
AST SERPL-CCNC: 31 U/L (ref 10–35)
BILIRUB SERPL-MCNC: 0.8 MG/DL (ref 0.2–1.2)
BUN SERPL-MCNC: 11 MG/DL (ref 7–25)
CALCIUM SERPL-MCNC: 9.4 MG/DL (ref 8.6–10.3)
CHLORIDE SERPL-SCNC: 103 MMOL/L (ref 98–110)
CHOLEST SERPL-MCNC: 190 MG/DL
CHOLEST/HDLC SERPL: 3.2 (CALC)
CO2 SERPL-SCNC: 25 MMOL/L (ref 20–32)
CREAT SERPL-MCNC: 0.7 MG/DL (ref 0.7–1.35)
EGFRCR SERPLBLD CKD-EPI 2021: 104 ML/MIN/1.73M2
GLUCOSE SERPL-MCNC: 86 MG/DL (ref 65–99)
HDLC SERPL-MCNC: 59 MG/DL
LDLC SERPL CALC-MCNC: 109 MG/DL (CALC)
NONHDLC SERPL-MCNC: 131 MG/DL (CALC)
POTASSIUM SERPL-SCNC: 4 MMOL/L (ref 3.5–5.3)
PROT SERPL-MCNC: 7.1 G/DL (ref 6.1–8.1)
SODIUM SERPL-SCNC: 138 MMOL/L (ref 135–146)
TRIGL SERPL-MCNC: 117 MG/DL

## 2025-05-02 PROCEDURE — 3008F BODY MASS INDEX DOCD: CPT | Performed by: STUDENT IN AN ORGANIZED HEALTH CARE EDUCATION/TRAINING PROGRAM

## 2025-05-02 PROCEDURE — 1036F TOBACCO NON-USER: CPT | Performed by: STUDENT IN AN ORGANIZED HEALTH CARE EDUCATION/TRAINING PROGRAM

## 2025-05-02 PROCEDURE — 99214 OFFICE O/P EST MOD 30 MIN: CPT | Performed by: STUDENT IN AN ORGANIZED HEALTH CARE EDUCATION/TRAINING PROGRAM

## 2025-05-02 ASSESSMENT — ENCOUNTER SYMPTOMS
UNEXPECTED WEIGHT CHANGE: 0
OCCASIONAL FEELINGS OF UNSTEADINESS: 0
LIGHT-HEADEDNESS: 0
DEPRESSION: 0
ABDOMINAL PAIN: 0
SHORTNESS OF BREATH: 0
CHILLS: 0
FEVER: 0
LOSS OF SENSATION IN FEET: 0
DIZZINESS: 0
EYE PAIN: 0
RHINORRHEA: 0
HEMATURIA: 0

## 2025-05-02 ASSESSMENT — PATIENT HEALTH QUESTIONNAIRE - PHQ9
2. FEELING DOWN, DEPRESSED OR HOPELESS: NOT AT ALL
1. LITTLE INTEREST OR PLEASURE IN DOING THINGS: NOT AT ALL
SUM OF ALL RESPONSES TO PHQ9 QUESTIONS 1 AND 2: 0

## 2025-05-03 LAB — URATE SERPL-MCNC: 2.9 MG/DL (ref 4–8)

## 2025-08-01 DIAGNOSIS — Z12.5 ENCOUNTER FOR PROSTATE CANCER SCREENING: ICD-10-CM

## 2025-08-01 DIAGNOSIS — M1A.09X0 CHRONIC GOUT OF MULTIPLE SITES, UNSPECIFIED CAUSE: ICD-10-CM

## 2025-08-01 DIAGNOSIS — Z00.00 ROUTINE GENERAL MEDICAL EXAMINATION AT A HEALTH CARE FACILITY: ICD-10-CM

## 2025-08-01 DIAGNOSIS — Z51.81 ENCOUNTER FOR MEDICATION MONITORING: ICD-10-CM

## 2025-08-01 DIAGNOSIS — R73.9 HYPERGLYCEMIA: ICD-10-CM

## 2025-08-01 DIAGNOSIS — E78.5 DYSLIPIDEMIA: ICD-10-CM

## 2025-08-27 LAB
ALBUMIN SERPL-MCNC: 4.5 G/DL (ref 3.6–5.1)
ALP SERPL-CCNC: 38 U/L (ref 35–144)
ALT SERPL-CCNC: 42 U/L (ref 9–46)
ANION GAP SERPL CALCULATED.4IONS-SCNC: 9 MMOL/L (CALC) (ref 7–17)
AST SERPL-CCNC: 44 U/L (ref 10–35)
BASOPHILS # BLD AUTO: 21 CELLS/UL (ref 0–200)
BASOPHILS NFR BLD AUTO: 0.4 %
BILIRUB SERPL-MCNC: 0.5 MG/DL (ref 0.2–1.2)
BUN SERPL-MCNC: 10 MG/DL (ref 7–25)
CALCIUM SERPL-MCNC: 9.1 MG/DL (ref 8.6–10.3)
CHLORIDE SERPL-SCNC: 101 MMOL/L (ref 98–110)
CHOLEST SERPL-MCNC: 139 MG/DL
CHOLEST/HDLC SERPL: 3.3 (CALC)
CO2 SERPL-SCNC: 28 MMOL/L (ref 20–32)
CREAT SERPL-MCNC: 0.83 MG/DL (ref 0.7–1.35)
EGFRCR SERPLBLD CKD-EPI 2021: 98 ML/MIN/1.73M2
EOSINOPHIL # BLD AUTO: 187 CELLS/UL (ref 15–500)
EOSINOPHIL NFR BLD AUTO: 3.6 %
ERYTHROCYTE [DISTWIDTH] IN BLOOD BY AUTOMATED COUNT: 13 % (ref 11–15)
EST. AVERAGE GLUCOSE BLD GHB EST-MCNC: 103 MG/DL
EST. AVERAGE GLUCOSE BLD GHB EST-SCNC: 5.7 MMOL/L
GLUCOSE SERPL-MCNC: 99 MG/DL (ref 65–99)
HBA1C MFR BLD: 5.2 %
HCT VFR BLD AUTO: 43.3 % (ref 38.5–50)
HDLC SERPL-MCNC: 42 MG/DL
HGB BLD-MCNC: 14.9 G/DL (ref 13.2–17.1)
LDLC SERPL CALC-MCNC: 66 MG/DL (CALC)
LYMPHOCYTES # BLD AUTO: 1690 CELLS/UL (ref 850–3900)
LYMPHOCYTES NFR BLD AUTO: 32.5 %
MCH RBC QN AUTO: 32 PG (ref 27–33)
MCHC RBC AUTO-ENTMCNC: 34.4 G/DL (ref 32–36)
MCV RBC AUTO: 92.9 FL (ref 80–100)
MONOCYTES # BLD AUTO: 447 CELLS/UL (ref 200–950)
MONOCYTES NFR BLD AUTO: 8.6 %
NEUTROPHILS # BLD AUTO: 2855 CELLS/UL (ref 1500–7800)
NEUTROPHILS NFR BLD AUTO: 54.9 %
NONHDLC SERPL-MCNC: 97 MG/DL (CALC)
PLATELET # BLD AUTO: 188 THOUSAND/UL (ref 140–400)
PMV BLD REES-ECKER: 10.3 FL (ref 7.5–12.5)
POTASSIUM SERPL-SCNC: 4.5 MMOL/L (ref 3.5–5.3)
PROT SERPL-MCNC: 7 G/DL (ref 6.1–8.1)
PSA SERPL-MCNC: 1.11 NG/ML
RBC # BLD AUTO: 4.66 MILLION/UL (ref 4.2–5.8)
SODIUM SERPL-SCNC: 138 MMOL/L (ref 135–146)
TRIGL SERPL-MCNC: 265 MG/DL
URATE SERPL-MCNC: 4.4 MG/DL (ref 4–8)
WBC # BLD AUTO: 5.2 THOUSAND/UL (ref 3.8–10.8)

## 2025-08-29 ASSESSMENT — PROMIS GLOBAL HEALTH SCALE
RATE_MENTAL_HEALTH: VERY GOOD
CARRYOUT_PHYSICAL_ACTIVITIES: COMPLETELY
RATE_AVERAGE_PAIN: 0
RATE_QUALITY_OF_LIFE: EXCELLENT
EMOTIONAL_PROBLEMS: NEVER
RATE_GENERAL_HEALTH: VERY GOOD
RATE_PHYSICAL_HEALTH: VERY GOOD
RATE_SOCIAL_SATISFACTION: EXCELLENT
CARRYOUT_SOCIAL_ACTIVITIES: EXCELLENT

## 2025-09-03 ASSESSMENT — ENCOUNTER SYMPTOMS
DIZZINESS: 0
EYE PAIN: 0
ABDOMINAL PAIN: 0
SHORTNESS OF BREATH: 0
LIGHT-HEADEDNESS: 0
RHINORRHEA: 0
UNEXPECTED WEIGHT CHANGE: 0
FEVER: 0
CHILLS: 0
HEMATURIA: 0

## 2025-09-05 ENCOUNTER — APPOINTMENT (OUTPATIENT)
Dept: PRIMARY CARE | Facility: CLINIC | Age: 65
End: 2025-09-05
Payer: COMMERCIAL

## 2025-09-05 PROBLEM — I10 PRIMARY HYPERTENSION: Status: ACTIVE | Noted: 2025-09-05

## 2025-09-05 ASSESSMENT — PATIENT HEALTH QUESTIONNAIRE - PHQ9
SUM OF ALL RESPONSES TO PHQ9 QUESTIONS 1 AND 2: 0
1. LITTLE INTEREST OR PLEASURE IN DOING THINGS: NOT AT ALL
2. FEELING DOWN, DEPRESSED OR HOPELESS: NOT AT ALL

## 2025-09-05 ASSESSMENT — ENCOUNTER SYMPTOMS
DEPRESSION: 0
OCCASIONAL FEELINGS OF UNSTEADINESS: 0
LOSS OF SENSATION IN FEET: 0